# Patient Record
Sex: FEMALE | Race: BLACK OR AFRICAN AMERICAN | NOT HISPANIC OR LATINO | Employment: STUDENT | URBAN - METROPOLITAN AREA
[De-identification: names, ages, dates, MRNs, and addresses within clinical notes are randomized per-mention and may not be internally consistent; named-entity substitution may affect disease eponyms.]

---
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
## 2021-06-13 NOTE — PROGRESS NOTES
Met with patient individually. She received her treatment plan, and signed with no changes made.      Ingris Balderas, [facility]   10/20/17

## 2021-06-13 NOTE — PROGRESS NOTES
"Late Entry- Note for the week of 10/23/17- 10/27/17    Weekly Progress Note  Debbie Calero  2000  [id number]      D) Pt attended 2 groups  this week with 0 absences. A) Staff facilitated groups and reviewed tx progress. R) Patient received her treatment plan following groups this week. Pt working on the following dimensions:      Dimension #1 - Withdrawal Potential - Risk 0. No Concern.  Specific goals from treatment plan addressed this week:  Patient to remain abstinent.   Effectiveness of strategies:  Patient has reported no use.       Dimension #2 - Biomedical - Risk 0. No Concern.   Specific goals from treatment plan addressed this week:  Patient to maintain good health.   Effectiveness of strategies:  Patient appears to continue to be in good physical health. She has reported no new medical concerns.       Dimension #3 - Emotional/Behavioral/Cognitive - Risk 2. Moderate Concern.  Specific goals from treatment plan addressed this week:  None  Effectiveness of strategies:  Patient was appropriately engaged in group this week, with good behavior. She did well to encourage her peer in group to leave what had happened earlier in the day \"at the door\" and make it a good group. She was supportive of her peer and her feelings.   She was able to open up and be honest with the group about concerns that she has for her grandmother and her use, after watching the national geographic video about heroin.   Patient has not yet completed her stress worksheets.       Dimension #4 - Treatment Acceptance/Resistance - Risk 1. Mild Concern.   Specific goals from treatment plan addressed this week:  Patient to be open minded about attending CD group.   Effectiveness of strategies:  Patient attended 2 groups this week. She did make verbalizations both days about not wanting to attend, and not liking CD, but she did well to remain engaged and participate throughout both groups.   Patient has not yet completed her Having an " Open Mind worksheet.       Dimension #5 - Relapse Potential - Risk 3. Serious Concern.   Specific goals from treatment plan addressed this week:  Patient to gain insight in to her use and the effects it has had on her life.   Effectiveness of strategies:  Patient was able to fully engage in the discussion about progression of substance use. She was was able to be open and honest about things that have happened in her life due to her use, and identify what stage of use she was in. She was able to verbalize that her use has caused her a significant amount of trouble, and see that it may not be the best idea for her to return to her use.   Patient has not yet completed her Healthy coping skills worksheet, or her Individual Change plan.     Dimension #6 - Recovery Environment - Risk 3. Serious Concern.  Specific goals from treatment plan addressed this week:  Patient to identify her goals, and how her use has interfered with her reaching those.   Effectiveness of strategies:  Patient was able to verbalize an understanding of how her use has impacted her life, and gotten her into trouble through the discussion on progression.   She has not yet completed her Goals worksheet.     T) Treatment plan updated No.  Patient notified and in agreement NA.  Patient educated on Progression of Substance Use & National Geographic; Heroin, Aniceto's Story video. Patient has completed 10 program hours at this time. Projected discharge date is 1/3/18.     NASIM Cameron  10/27/2017      Psycho-Educational Curriculum Date Attended Psycho-Educational Curriculum Date Attended   Acceptance        Diagnostic Criteria 10/18/17      Progression 10/25/17 Mental Health                            Relapse          Sober Structure            Medical Aspects        Drug Categories 1013/17                       Educational Videos        Turning Point       NG: Heroin Crisis        NG: Effects of Marijuana 10/20/17      NG: Heroin;  Aniceto's  Story 10/27/17   Relationships   NG: Ecstasy       On the Outs        Which Brain Do You Want        DUI: Dead in 5 Seconds       Intervention:        Intervention:       Intervention:        Intervention:        20/20: A Deadly Drunk Driving Accident        Drunk in Public     Feelings   Addiction      20/20: Intoxication Nation        Unguarded        NG: World's Most Dangerous Drug

## 2021-06-13 NOTE — PROGRESS NOTES
D) Debbie Swain a 16 y.o. single  Black Female  who was referred by Makenna Garzon, Primary Therapist at [facility] to HE AIOP at [facility].  Patient orientated x 4.  Patient meets criteria for Cannabis Use Disorder, Moderate (F12.20).  Patient appears appropriate for HE AIOP at [facility].     A)  Completed intake assessment; preliminary paperwork;  counselor & supervisor license number and contact info;  Patient Bill of Rights; HE AIOP rules/regulations; Abuse Prevention Plan; confidentiality & HIPPA policies; grievance procedure; presented ROIs; and TB & HIV/AIDS policies & resources.       R)  Patient signed and agreed to  counselor & supervisor license number and contact info.; Patient Bill of Rights; HE AIOP rules/regulations; Abuse Prevention Plan; confidentiality & HIPPA policies; grievance procedure; presented ROIs,  and TB & HIV/AIDS policies & resources.   Dimension #1 - Withdrawal Potential - risk 0, no concern. Patient reports a history of regular marijuana use. It appears that she may have been minimizing her use, but throughout the assessment it became apparent that she was using more frequently than she originally stated, as she initially stated that she only uses on weekends, and then shared that she often uses throughout the week to relieve stress and help her sleep. Patient was using marijuana on a weekly basis.  Patient denies withdrawal symptoms. Her last reported use of marijuana was in June 2017. She also reports experimentation with alcohol, and xanax, but no pattern of use with those. Patient does not appear to be at risk of withdrawal at this time.   Dimension #2 - Biomedical - risk 0, no concern. Patient is currently under the care of [facility] [place], operated by [facility] for all medical issues. Patient is a healthy young woman with no major health concerns at this time.  Dimension #3 - Emotional , Behavioral  and Cognitive - risk 2, Moderate concern. Patient reports a mental health diagnosis of depression. She reports  "that she is not currently on any medication for this, and does not receive any services for this at this time. It is unclear if her depression is managed and stable at this time. She may struggle with impulse control as evidence by her actions in the community that led to her placement at the [facility]. She has been working to meet program expectations while at the [facility], but does struggle at times with impulsive behavior. Patient denies any suicidal or homicidal thoughts or plans.   Dimension #4 - Treatment  Acceptance - risk 1, Mild concern. Patient reports that she does not feel that she has a problem, and appears to have minimized her use throughout this assessment. She was calm and cooperative throughout this intake. Patient does not appear motivated for treatment at this time, and her motivation appears to be due to external factors such as probation and her placement at the [facility]. Patient does verbalize some willingness to discontinue her use, but it is unclear if this is genuine at this time.   Dimension #5 - Relapse Potential - risk 3, Serious concern. Patient reports no past treatment experience, which may indicate a lack of knowledge and understanding of her substance use and recovery. She appears to have minimized her use throughout her assessment, as some of the information she presented, she later conflicted herself and revealed that she used more than what she originally stated. She appears to lack insight into her use and how it negatively impacted her life, as evidence by her stating that she was not going to school, and would sometimes use instead. She appears to lack coping skills to arrest her use and prevent relapse, as evidence by her smoking \"when she wants to.\" She also appears to lack healthy coping skills for her life, as she reports that she would smoke when she was stressed out. She appears to be at an increased risk of relapse at this time due to that lack of insight and openness about the " extent of her use.  Dimension #6 - Recovery Environment - risk 3, Serious concern. Patient reports that she was not regularly attending school while in the community, and so appears to lack structured and meaningful activity in the community. She reports that she gave up sports teams at school due to her use, which she identified as important to her. Patient reports that most of her peers in the community use marijuana as well, and so it does not appear that she has support. Patient reports that her mother is supportive of her being sober, but that she otherwise does not have support for her sobriety. Patient is currently in an out of home court placement at the [facility] due to failure to meet probation expectations. Patient is currently on probation and will remain on after leaving the [facility].     T) Explained counselor & supervisor license number and contact info;  Patient Bill of Rights; HE AIOP rules/regulations; Abuse Prevention Plan; confidentiality & HIPPA policies; grievance procedure; presented ROIs; and TB & HIV/AIDS policies & resources.      NASIM Cameron    10/13/2017

## 2021-06-13 NOTE — PROGRESS NOTES
Individual Treatment Plan    Patient  Name: Debbie Calero  MRN: [id number]   : 2000  Admit Date: 10/13/17  Date of Initial Service Plan: 10/13/17  Tentative Discharge Date: 1/3/17    Counselor: NASIM Cameron      Dimension 1: Acute Intoxication/Withdrawal Potential, Risk level: 0- No Concern.     Problem: Patient denies any withdrawal symptoms, and reports that her last use of marijuana was in 2017.   Goal: Patient will remain abstinent throughout her time in treatment, and at the [facility].   Must be reached to complete treatment? Yes  Methods/Strategies (must include amount and frequency): Patient will report to her counselor immediately if any relapse happens.  Target Date: 1/3/17  Completion Date:     Problem: Patient reports a history of daily nicotine use  Goal: Patient will gain knowledge about the effects of smoking and the benefits of stopping.   Must be reached to complete treatment? Yes  Methods/Strategies (must include amount and frequency): Patient will receive educational information about smoking cessation and the benefits.   Target Date: 1/3/17  Completion Date:     Dimension 2: Biomedical Conditions/Complications, Risk level: 0- No Concern.    Problem: Patient denies any medical concerns, and appears to be in good physical health.   Goal: Patient will maintain good health.  Must be reached to complete treatment? No  Methods/Strategies (must include amount and frequency): Patient will practice proper diet, sleep, and exercise habits daily in order to maintain her health.   Target Date: 1/3/17  Completion Date:       Dimension 3: Emotional/Behavioral/Cognitive, Risk level: 2- Moderate Concern.     Problem: Patient reports that she often used as a result of stress.   Goal: Patient will gain insight into her stress.   Must be reached to complete treatment? Yes  Methods/Strategies (must include amount and frequency): Patient will complete RECOGNIZING STRESS worksheet, and share with  her counselor.   Target Date: 10/27/17  Completion Date:     Problem: Patient appears to lack skills to manage her stress in a healthy way.  Goal: Patient will gain skills to help manage her stress in a healthy way.  Must be reached to complete treatment? Yes  Methods/Strategies (must include amount and frequency): Patient will complete STRESS MANAGEMENT worksheet, and share with her counselor.   Target Date: 11/3/17  Completion Date:       Dimension 4: Readiness to Change, Risk level 1- Mild Concern.     Problem: Patient has verbalized that she does not want to attend CD group.   Goal: Patient will be open minded about being in CD and activity participate.  Must be reached to complete treatment? Yes  Methods/Strategies (must include amount and frequency): Patient will attend group 2 times per week, for 2 hours each time and actively engage.   Target Date: 1/3/17  Completion Date:     Problem: Patient appears resistant toward new things.  Goal: Patient will work to be more open minded about new experiences.  Must be reached to complete treatment? Yes  Methods/Strategies (must include amount and frequency): Patient will complete HAVE AN OPEN MIND worksheet, and share with her counselor.  Target Date: 11/17/17  Completion Date:       Dimension 5: Relapse/Continued Use/Continued Problem Potential, Risk level: 3- Serious Concern.     Problem: Patient lacks healthy coping skills to prevent relapse and manage life stress.  Goal: Patient will gain coping skills to help her prevent relapse and deal with her life stress.   Must be reached to complete treatment? Yes  Methods/Strategies (must include amount and frequency): Patient will complete HEALTHY COPING SKILLS worksheet, and share with her counselor.   Target Date: 12/1/17  Completion Date:     Problem: Patient lacks a structured plan for how to maintain abstinent in the community.   Goal: Patient will develop a plan for how she will remain sober in the community.   Must  be reached to complete treatment? Yes  Methods/Strategies (must include amount and frequency): Patient will complete INDIVIDUAL CHANGE PLAN workbook, and share with her counselor.  Target Date: 12/22/17  Completion Date:     Dimension 6: Recovery Environment, Risk level: 3- Serious Concern.     Problem: Patient lacked structure and regular prosocial activity in the community.   Goal: Patient will develop a plan for how she will reach her goals in the future, and how her use has gotten her off track.   Must be reached to complete treatment? Yes  Methods/Strategies (must include amount and frequency): Patient will complete GOALS worksheet, and share with her counselor.   Target Date: 12/8/17  Completion Date:     Problem: Patient was not regularly attending school in the community.   Goal: Patient will develop a plan for where she will attend school following the [facility].   Must be reached to complete treatment? No  Methods/Strategies (must include amount and frequency): Patient will work with her transition  to determine where she will attend school following the [facility], and enroll.  Target Date: 1/3/17  Completion Date:         Resources  Resources to which the patient is being referred for problems when problems are to be addressed concurrently by another provider: NABOR Alonzo/Primary Therapist, [facility] Staff, [facility], Probation.      By signing this document, I am acknowledging that I was actively and directly involved in the development of my treatment plan.           Patient  Signature_________________________________________         Date__________________        Staff Signature  NASIM Cameron    Date: 10/20/2017, 11:25 AM

## 2021-06-13 NOTE — PROGRESS NOTES
Note for the week of 10/16/17-10/20/17    Weekly Progress Note  Debbie Calero  2000  [id number]      D) Pt attended 2 groups  this week with 0 absences. A) Staff facilitated groups and reviewed tx progress. R) Patient received her treatment plan following groups this week. Pt working on the following dimensions:    Dimension #1 - Withdrawal Potential - Risk 0. No Concern.  Specific goals from treatment plan addressed this week:  Patient to remain abstinent.   Effectiveness of strategies:  Patient has reported no use.     Dimension #2 - Biomedical - Risk 0. No Concern.   Specific goals from treatment plan addressed this week:  None  Effectiveness of strategies:  NA    Dimension #3 - Emotional/Behavioral/Cognitive - Risk 2. Moderate Concern.  Specific goals from treatment plan addressed this week:  None- Patient received her treatment plan following groups this week and so has not yet been able to work on her assignments.  Effectiveness of strategies:  NA    Dimension #4 - Treatment Acceptance/Resistance - Risk 1. Mild Concern.   Specific goals from treatment plan addressed this week:  Patient to be open minded about attending CD group.   Effectiveness of strategies:  Patient attended 2 groups this week. She did make verbalizations both days about not wanting to attend, and not liking CD, but she did well to remain engaged and participate throughout both groups.     Dimension #5 - Relapse Potential - Risk 3. Serious Concern.   Specific goals from treatment plan addressed this week:  None- Patient received her treatment plan following groups this week and so has not yet been able to work on her assignments.  Effectiveness of strategies:  NA    Dimension #6 - Recovery Environment - Risk 3. Serious Concern.  Specific goals from treatment plan addressed this week:  None- Patient received her treatment plan following groups this week and so has not yet been able to work on her assignments.  Effectiveness of  strategies:  NA    T) Treatment plan updated No.  Patient notified and in agreement NA.  Patient educated on Diagnostic Criteria & [employer] Geographic; Effects of Marijuana Video. Patient has completed 6 program hours at this time. Projected discharge date is 1/3/18.     NASIM Cameron  10/20/2017      Psycho-Educational Curriculum Date Attended Psycho-Educational Curriculum Date Attended   Acceptance        Diagnostic Criteria 10/18/17         Mental Health                            Relapse          Sober Structure            Medical Aspects        Drug Categories 1013/17                       Educational Videos        Turning Point       NG: Heroin Crisis        NG: Effects of Marijuana 10/20/17      NG: Cocaine     Relationships   NG: Ecstasy       On the Outs        Which Brain Do You Want        DUI: Dead in 5 Seconds       Intervention:        Intervention:       Intervention:        Intervention:        20/20: A Deadly Drunk Driving Accident        Drunk in Public     Feelings   Addiction      20/20: Intoxication Nation        Unguarded        NG: World's Most Dangerous Drug

## 2021-06-13 NOTE — PROGRESS NOTES
Addiction Services - Initial Services Plan   Name:Debbie Calero   : 2000   MRN: [id number]       Patient describes their immediate need: To learn sober living skills to prevent relapse.     Are there any immediate Safety Needs such as (physical, stability, mobility): Patient is under the care of [facility]. Pt is able to get medical care as needed. Pt denies immediate concerns.     Immediate Health Needs and Plan:   Remain clean and sober and attend first group therapy session on 10/13/17.     Vulnerable Adult: No     [ ] Continue Current Medications for:    [ ] Request Consult for:   [ ] Notify Attending Physician about:   [ ] Other:     Issues to be addressed in the first sessions:   Treatment planning, orientation to group norms and expectations, and welcomed by peers. Appropriate group behavior.    Patient strengths and needs:   Strengths identified as being a leader, is working to make changes in her life, funny, and relaxed.   Needs identified as needing to work to not get annoyed by others' habits, and when she perceives that others are talking down to her.     Plan for patient for time between intake and completion of the treatment plan:   Attend all group therapy sessions as directed, complete all written and oral assignments as directed, and remain clean and sober. A relapse, if any, must be reported to staff immediately in order to ensure you are receiving the proper level of care.     Staff Members' Titles authorized to Initiate Services are:   Director of Behavioral Service   Clinical Director of Chemical Dependency   [facility] Counselor   MI/CD Counselor        Vulnerable Adult Review   [X] Review of the facility Abuse Prevention plan was reviewed with the patient   [X] No individual abuse plan is necessary   [ ] In addition to the facility Abuse Prevention plan, an Individual Abuse Plan will be put in place     I understand these goals to be the Treatment Goals of the  Program, and I agree to the stated Methods in attempting to accomplish these goals.     Patient Signature: _________________________Date: 10/13/2017       Staff Name/Title: NASIM Cameron Date: 10/13/2017   Time: 9:42 AM

## 2021-06-13 NOTE — PROGRESS NOTES
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 supportive or antagonistic to the person making changes.       Safe, no concerns mentioned at this time.     9. Criminal justice history: Gather current/recent history and any significant history related to substance use--Arrests? Convictions? Circumstances? Alcohol or drug involvement? Sentences? Still on probation or parole? Expectations of the court? Current court order? Any sex offenses - lifetime? What level? (DSM)       Theft, and probation violations. She was often on the run.     10. What obstacles exist to participating in treatment? (Time off work, childcare, funding, transportation, pending senior living time, living situation)   NA    Dimension VI Ratings   Recovery environment - The placing authority must use the criteria in Dimension VI to determine a client s recovery environment.   RISK DESCRIPTIONS - Severity rating: 3-Patient is not engaged in structured, meaningful activity and the patient's peers, family , significant other, and living environment are unsupportive, or there is significant criminal justice system involvement.    REASONS SEVERITY WAS ASSIGNED - Patient reports that she was not regularly attending school while in the  community, and so appears to lack structured and meaningful activity in the community. She reports that she gave up sports teams at school due to her use, which she identified as important to her. Patient reports that most of her peers in the community use marijuana as well, and so it does not appear that she has support. Patient reports that her mother is supportive of her being sober, but that she otherwise does not have support for her sobriety. Patient is currently in an out of home court placement at the [facility] due to failure to meet probation expectations. Patient is currently on probation and will remain on after leaving the [facility].          Client Choice/Exceptions     Would you like services specific to language, age, gender, culture, Voodoo preference, race, ethnicity, sexual orientation or disability?  yes    If yes, specify:  Client is open to diverse groups.      What particular treatment choices and options would you like to have?  Female, Adolescent     Do you have a preference for a particular treatment program?  [facility]    DSM-V Criteria for Substance Abuse  Instructions  Determine whether the client currently meets the criteria for a Substance Use Disorder using the diagnostic criteria in the DSM-V, pp. 481-589. Current means during the most recent 12 months outside a facility that controls access to substances.    Category of substance Severity ICD-10 Code/DSM V Code   []  Alcohol Use Disorder [] Mild  [] Moderate  [] Severe [] (F10.10) (305.00)  [] (F10.20) (303.90)  [] (F10.20) (303.90)   [x]  Cannabis Use Disorder [] Mild  [x] Moderate  [] Severe [] (F12.10) (305.20)  [x] (F12.20) (304.30)  [] (F12.20) (304.30)   [] Hallucinogen Use Disorder [] Mild  [] Moderate  [] Severe [] (F16.10) (305.30)  [] (F16.20) (304.50)  [] (F16.20) (304.50)   []  Inhalant Use Disorder [] Mild  [] Moderate  [] Severe [] (F18.10) (305.90)  [] (F18.20) (304.60)  [] (F18.20) (304.60)   []  Opioid Use Disorder []  Mild  [] Moderate  [] Severe [] (F11.10) (305.50)  [] (F11.20) (304.00)  [] (F11.20) (304.00)   []  Sedative, Hypnotic, or Anxiolytic Use Disorder [] Mild  [] Moderate  [] Severe [] (F13.10) (305.40)   [] (F13.20) (304.10)  [] (F13.20) (304.10)   []  Stimulant Related Disorders [] Mild  [] Moderate  [] Severe [] (F15.10) (305.70) Amphetamine type substance  [] (F14.10) (305.60) Cocaine  [] (F15.10) (305.70) Other or unspecified stimulant  [] (F15.20) (304.40) Amphetamine type substance  [] (F14.20) (304.20) Cocaine  [] (F15.20) (304.40) Other or unspecified stimulant  [] (F15.20) (304.40) Amphetamine type substance  [] (F14.20) (304.20) Cocaine  [] (F15.20) (304.40) Other or unspecified stimulant   []  Tobacco use Disorder [] Mild  [] Moderate  [] Severe [] (Z72.0) (305.1)  [] (F17.200) (305.1)  [] (F17.200) (305.1)   []  Other (or unknown) Substance Use Disorder [] Mild  [] Moderate  [] Severe [] (F19.10) (305.90)  [] (F19.20) (304.90)  [] (F19.20) (304.90)       Suggested Level of Care Necessary for Recovery  []  Inpatient  []  Extended Care []  Residential [x]  Outpatient  []  None      Collateral Contact Summary   Number of contacts made:  2  Contact with referring person:  yes     If court related records were reviewed, summarize here:  Patient has 3 UAs that tested positive for THC on 1/27/16, 1/30/17, 3/9/17     [x]   Information from collateral contacts supported/largely agreed with information from the client and associated risk ratings.   []   Information from collateral contacts was significantly different from information from the client and lead to different risk ratings.      Summarize new information here:      Rule 25 Assessment Summary and Plan   's Recommendation    Recommendation is for this patient to attend the [employer] [facility] program while at the [facility] and follow aftercare recommendations once programming is complete.      Collateral Contacts     Please duplicate this page for each  contact.  If this includes information which is sensitive and not public, separate this page from the rest of the assessment before sharing.  Retain the page in the assessment file.   Name    Makenna Garzon, [facility] Relationship    /Primary Therapist @ [facility] Phone Number    [phone] Releases    yes       Information Provided:      Makenna reported the following:    She has 3 positive UA s in MAIN for THC. 1/27/16, 1/30/17 and 3/9/17.   She did talk about regular use and also using to help her go to sleep (or she could not sleep otherwise).   Her mom has mentioned that she was concerned about her use as well.   She may be motivated to downplay her use because she wants to leave early.      Collateral Contacts     [facility]   Relationship     Phone Number    [phone] Releases    yes       Information Provided:      Lacey reported the following:    She did not know the patient well until she came to the [facility], because she was always on the run.   The patient has self-reported to her, that she was using marijuana.   The patient has a  in the community that has been working with her for longer, and may have more information. (Jess Franco).       Staff Name and Title:  NASIM Cameron      Date: 10/4/2017  Time:  12:17 PM

## 2021-06-13 NOTE — PROGRESS NOTES
Late Entry for week of 10/9/17-10/13/17,     Patient attended her first group on 10/13/17. She was engaged and behaved appropriately with no issues.   She has not yet received her treatment plan as she completed her intake today, but will be presented with it for review within the next 7 days.     NASIM Cameron   10/19/2017
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
## 2021-06-15 NOTE — PROGRESS NOTES
[facility] SUBSTANCE USE DISORDER  DISCHARGE SUMMARY            NAME:  Debbie Calero   Physician:  Dr. Jimenez   MRN:  [id number] :  Ingris Balderas [facility]   SS#:  [id number] Funding Source:  [facility]   Admit Date: 10/13/17 Discharge Date: 1/3/18     :  2000 Hours Completed: 37   Initial Diagnosis:  Cannabis Use Disorder, Moderate (F12.20). Final Diagnosis:  Cannabis Use Disorder, Moderate (F12.20)- In Early Remission, In a Controlled Environment.    Discharge Address:    [address] [place] [id number]      Discharge Type:  With Staff Approval (WSA)    Reasons for and circumstances of service termination:  Patient has completed all of her treatment plan assignments, as well as the required time in group. She appears to have gained knowledge about how her use has impacted her in the past, and has made verbalizations that she is motivated for sobriety at this time. She appears genuine in her motivation at this time. She has successfully completed the program and is being discharged with staff approval.      Dimension/Course of Treatment/Individualized Care:   1.  Withdrawal Potential - Risk level -  0  Treatment plan goals and progress towards those goals:  Patient has maintained abstinence throughout her time in the program and at [facility]. She has reported no use or relapse, and has produced clean UAs upon return from home visits. She denied withdrawal symptoms throughout her time at [facility], and does not appear to be at risk of withdrawal at this time. She was provided with educational information about smoking cessation and was also engaged in group activities about the benefits of smoking cessation during her time in treatment.        2.  Biomedical Conditions and Complications - Risk level -  0  Treatment plan goals and progress towards those goals:  Patient has maintained good health throughout her time in treatment. She has remained under the care of  [facility], and has utilized their services for her health concerns and needs as necessary. She has not expressed any new or worsening health concerns during her time in group. She appears to be in good health at her time of discharge from treatment programming. She will remain under the care of [facility] until released from [facility].        3.  Emotional/Behavioral/Cognitive Conditions and Complications - Risk level -  1  Treatment plan goals and progress towards those goals:  Patient did well to maintain appropriate behavior during her time in treatment groups. She was an active participant, despite her initial verbalizations of not wanting to be in group, and was often a main contributor to group discussions. She was able to complete her stress worksheet, and appears able to identify situations that may cause her stress and she was able to develop a plan for how she will manage that in a healthy way. Patient reported that she began medication for her anxiety while in group and that it was helpful at the time. It is unknown if the patient continues to be on that medication. She has been doing well in her time in treatment, and it appears in [facility] programming as well. She works to show leadership in the cottage, but does have minor struggles at times. She denied any suicidal thoughts or plans throughout her time in treatment.        4.  Treatment Acceptance/Resistance - Risk Level -  0  Treatment plan goals and progress towards those goals:  Patient has shown willingness to attend and actively participate in treatment groups. She did well to engage and participate, and be a main contributor to the group discussions. She no longer reports that she does not want to be in treatment, and verbalizes motivation for change and sobriety. She attended group 2 times per week, for the full time provided each time. She did complete her having an open mind worksheet, and demonstrated her ability to do so while  in treatment.        5.  Relapse/Continued Use/Continued Problem Potential - Risk level -  2  Treatment plan goals and progress towards those goals:  Patient attended educational groups in order to gain knowledge about substance use and recovery. She demonstrated some insight into her use and how it has caused issues in her life in the past. She verbalizes motivation to maintain her sobriety in the future. She completed her healthy copings skills worksheets in order to develop a list of ways that she can remain sober and effective refuse. She also completed her individual change plan, as a way to plan to maintain her sobriety in the community. She appears to have gained some skills for her recovery, but may be at some risk of relapse due to her lack of practice utilizing the skills she has learned in real life situations.        6.  Recovery Environment - Risk level -  2  Treatment plan goals and progress towards those goals:  Patient has lacked structure in her life previously. She has been working with her transition  to enroll in school, as well as to find a job for when she is released. She appears to be working to ensure that she has healthy activities that will provide structure and accountability in her life following her time at [facility]. She was able to identify goals that she has for herself and has shared some of her goals while in treatment. She has been attending school daily while at [facility], and may be leaving [facility] in order to start at her school in the community at the beginning of the semester. This is not an absolute, and will depend on the patient's behavior and program involvement at [facility]. She reports that she has been working on her relationship with her mother and that her mother is a positive support for her sobriety. It is unclear if she has other positive support or positive peers in the community at this time. Patient remains on probation and is expected to meet expectations, and  follow recommendations even upon release from [facility].        Strengths: Willing to learn, positive, leader, insightful, motivated for change, helpful, hard working, family support, strong willed, able to verbalize feelings appropriately.      Needs: ability to regulate her attitude in situations she does not agree with, prosocial activity, structure, accountability, positive peer group, personal responsibility.      Services Provided: Intake, assessment, treatment planning, education, group discussion, film, lectures, 1x1 therapy, and recommendations at discharge.            Program Involvement: Excellent  Attendance: Excellent  Ability to relate in group/   Other program activities: Good  Assignment Completion: Good  Overall Behavior: Good  Reported Family/Significant   Other Involvement: Unknown    Prognosis: Good      Recommendations       Abstain from all mood altering chemicals, identify and maintain a sober network of friends and follow probation expectations including random UAs.  Engage in structured activities such as school, employment, sports, library, [facility] membership and daily exercise.    Attend 2-community sobriety meetings, in order to know where they are in case she feels she needs more support in the community.    Here are the numbers to find 12 step meetings:  [facility] Intergroup Office: [phone]  NA's Help Line: [phone].    If relapse should occur, call [facility] at [phone] to be reassessed.      Additional services if relapse should occur:  -[facility]-[phone]  -[facility]- [phone]  -[facility]-[phone]    Mental Health Referral  Follow recommendations from [facility].    Physician Health Referral  Follow recommendations from [facility].      Counselor Name and Title:  NASIM Cameron        Date:  1/5/2018  Time:  3:51 PM

## 2021-07-04 NOTE — PROGRESS NOTES
Rule 31 by Ingris Balderas LADC at 10/13/2017  9:00 AM     Author: Ingris Balderas LADC Service: Addiction Care Author Type: Licensed Alcohol and Drug Counselor    Filed: 10/13/2017 10:41 AM Encounter Date: 10/13/2017 Status: Signed    : Ingris Balderas LADC (Licensed Alcohol and Drug Counselor)           [facility] Assessment Summary  Date: 10/13/2017        : NASIM Cameron    Name: Debbie Calero  Address: [address] [place] [id number]  Phone: [phone] (home)   Referral Source: NABOR Alonzo/Primary Therapist  : 2000  Age: 16 y.o.  Race/Ethnicity: Black or   Marital Status: Single, Never   Employment: Full Time Student                                                                                                                       Level of Education: currently in 10th grade   Socio-economic (yearly Income) Status: NA  Sexual Orientation: Heterosexual   Last 4 digits of Social Security: 5452    Is assistance required in the ability to read and understand written material?   no     Reason for seeking services:    Patient was referred for her assessment by her primary therapist at the [facility].   She reports that she does not believe she needs to change, but she is here due to dirty UAs, and concern from her .     Dimension I Acute intoxication/Withdrawal Potential:    Symptomology (past 12 months, check all that apply)  binges, medicinal use, using alone, repeated family or social problems and family history of addiction    Observed or reported (withdrawal symptoms, check all that apply)  none reported or displayed    Chemical use most recent 12 months outside a facility and other significant use history (client self-report)  Primary Drug Used  Age of First Use  Most Recent Pattern of Use and Duration    Date of last use  Time if substance use in the last 30 days Withdrawal Potential? Requiring special care  Method of use    (oral, smoked, snort, IV, etc)    Alcohol  16 1x- 1 shot of Shanthi 2017 NA none oral   Marijuana/Hashish  15 1x per week, on the weekend (2 blunts throughout the weekend.   She later stated that she smokes when she is stressed throughout the week, and will smoke 1 blunt over the span of 4 days. 2017 NA none smoking   Cocaine/Crack   Denies       Meth/Amphetamines   Denies       Heroin   Denies       Other Opiates/Synthetics   Denies       Inhalants   Denies       Benzodiazepines  16 Xanax- 2x, 1/2 a tablet 2017 NA none oral   Hallucinogens   Denies       Barbiturates/Sedatives/Hypnotics   Denies       Over-the-Counter Drugs   Denies       Other   Denies       Nicotine  16 2-3 cigarettes daily 2017 NA none smoking         Dimension I Risk Ratin- No Concern.    Reason Risk Rating Assigned: Patient reports a history of regular marijuana use. It appears that she may have been minimizing her use, but throughout the assessment it became apparent that she was using more frequently than she originally stated, as she initially stated that she only uses on weekends, and then shared that she often uses throughout the week to relieve stress and help her sleep. Patient was using marijuana on a weekly basis.  Patient denies withdrawal symptoms. Her last reported use of marijuana was in 2017. She also reports experimentation with alcohol, and xanax, but no pattern of use with those. Patient does not appear to be at risk of withdrawal at this time.         Dimension II Biomedical Conditions:    Any known health conditions: No    Ever previously treated/diagnosed with any eating disorder?  no     List Health Concerns/Conditions Reported: Denies    Are Health Concerns/Conditions being treated? Yes  By Whom? [facility]    Are you pregnant: No OB care received:NA CPS call needed: NA        Dimension II Risk Ratin- No Concern.   Reason Risk Rating Assigned: Patient is currently under  the care of [facility] for all medical issues. Patient is a healthy young woman with no major health concerns at this time.        Dimension III Emotional/Behavioral/Cognitive:    Oriented to person, place, time, situation?  Yes     Current Mental Health Services: yes - Receiving services from [facility]    Past Hospitalization for MH or psychiatric problems: No    How many Hospitalizations: 0   Last Hospitalization; date and location: NA      Past or Current Issues with Gambling (Explain): no    Prior Treatment for Gambling: No     MH Diagnoses:    Depression    Psychiatrist: None     Clinic: [facility]      Current Psychotropic Medications:  None reported    Taking medications as prescribed: NA   Medications Helpful:  NA     Current Suicidal Ideation: No  If yes, any plan? NA  What is plan?:   None    Previous Suicide Attempts?  No   Explain: Denies     Current Homicidal Ideation: No  If yes, any plan? No   What is plan?: NA    Previous Homicide Attempts? No Explain: NA    Suicidal/Homicidal Ideation in last 30 days? No  Explain: Denies     Hazardous behavior engaged in which placed self or others in danger (i.e., operating a motor vehicle, unsafe sex, sharing needles, etc.)?   Denies.      Family history of substance and/or mental health diagnosis/issues?  Yes  Explain: Patient reports that her mother was in group home for heroin and crack cocaine use. She reports that her mother and step-father have depression, anxiety and bipolar disorder.      History of abuse (Physical, Emotional, Sexual)? No  Explain: Denies.        Dimension III Risk Ratin- Moderate Concern.   Reason Risk Rating Assigned: Patient reports a mental health diagnosis of depression. She reports that she is not currently on any medication for this, and does not receive any services for this at this time. It is unclear if her depression is managed and stable at this time. She may struggle with impulse control  "as evidence by her actions in the community that led to her placement at the [facility]. She has been working to meet program expectations while at the [facility], but does struggle at times with impulsive behavior. Patient denies any suicidal or homicidal thoughts or plans.         Dimension IV Readiness to Change:    Mandated, or coerced into assessment or treatment:  Yes    Does client feel there is a problem:   No    Verbalization of need/desire to change:   No     Impression of : (Check all that apply):    cooperative, ambivalent about change and low motivation    Are there any spiritual, cultural, or other special needs to be addressed for client to be successful in treatment? no      Dimension IV Risk Ratin- Mild Concern.    Reason Risk Rating Assigned: Patient reports that she does not feel that she has a problem, and appears to have minimized her use throughout this assessment. She was calm and cooperative throughout this intake. Patient does not appear motivated for treatment at this time, and her motivation appears to be due to external factors such as probation and her placement at the [facility]. Patient does verbalize some willingness to discontinue her use, but it is unclear if this is genuine at this time.         Dimension V Relapse/Continued Use/Continued Problem Potential     Client age at First Treatment: NA    Lifetime # of CD Treatments:  NA  List program, dates, and status of completion (within last five years): NA    Longest Period of Abstinence: 4.5 months  How did you accomplish this? School was starting, she was involved in sports teams that required her to be sober.      Circumstances which led to Relapse: \"just wanting to smoke.\"    Risk Taking/Problem Behaviors Related to Use and/or Under the Influence: Denies.       Dimension V Risk Rating: 3- Serious Concern.    Reason Risk Rating Assigned: Patient reports no past treatment experience, which may indicate a lack of knowledge and understanding " "of her substance use and recovery. She appears to have minimized her use throughout her assessment, as some of the information she presented, she later conflicted herself and revealed that she used more than what she originally stated. She appears to lack insight into her use and how it negatively impacted her life, as evidence by her stating that she was not going to school, and would sometimes use instead. She appears to lack coping skills to arrest her use and prevent relapse, as evidence by her smoking \"when she wants to.\" She also appears to lack healthy coping skills for her life, as she reports that she would smoke when she was stressed out. She appears to be at an increased risk of relapse at this time due to that lack of insight and openness about the extent of her use.        Dimension VI Recovery Environment   Family support:  Yes  Peer Sober Support:  No    Current living circumstances:  Mohan in a juvenile court placement at [facility], has plans to live with her mother in the community.    Environment supportive of recovery:  Yes    Specific activities participating in which do not involve substance use:  Playing sports- step team, basketball, and football    Specific activities participating in which do involve substance use:  Patient reports that she used to help her sleep. She reports that otherwise she would continue on her normal life even while under the influence.     People, things that threaten recovery: yes - old peer group.     Expected family involvement during treatment services:  Limited family involvement, primarily at mid-point and pre-release staffing with treatment team present    Current Legal Involvement:  Juvenile Probation    Legal Consequences related to use: theft, probation violations. Patient reports that she was frequently on the run as well.     Occupational/Academic consequences related to use: Patient was not consistently going to school while in the " community.     Current ability to function in a work and/or education setting: yes    Current support network for recovery (including community-based recovery support): None    Do you belong to a [place]: No Which [place]?   Reside on reservation: NA    Dimension VI Risk Rating: 3- Serious Concern.   Reason Risk Rating Assigned: Patient reports that she was not regularly attending school while in the community, and so appears to lack structured and meaningful activity in the community. She reports that she gave up sports teams at school due to her use, which she identified as important to her. Patient reports that most of her peers in the community use marijuana as well, and so it does not appear that she has support. Patient reports that her mother is supportive of her being sober, but that she otherwise does not have support for her sobriety. Patient is currently in an out of home court placement at the [facility] due to failure to meet probation expectations. Patient is currently on probation and will remain on after leaving the [facility].           DSM-V Criteria for Substance Abuse  Instructions:  Determine whether the client currently meets the criteria for a Substance Use Disorder using the diagnostic criteria in the  DSM-V, pp. 481-589. Current means during the most recent 12 months outside a facility that controls access to substances.    Category of substance Severity ICD-10 Code/DSM V Code  Alcohol Use Disorder Mild  Moderate  Severe (F10.10) (305.00)  (F10.20) (303.90)  (F10.20) (303.90)   Cannabis Use Disorder Mild  Moderate  Severe (F12.10) (305.20)  (F12.20) (304.30)  (F12.20) (304.30)   Hallucinogen Use Disorder Mild  Moderate  Severe (F16.10) (305.30)  (F16.20) (304.50)  (F16.20) (304.50)   Inhalant Use Disorder Mild  Moderate  Severe (F18.10) (305.90)  (F18.20) (304.60)  (F18.20) (304.60)   Opioid Use Disorder Mild  Moderate  Severe (F11.10) (305.50)  (F11.20) (304.00)  (F11.20) (304.00)   Sedative, Hypnotic, or  Anxiolytic Use Disorder Mild  Moderate  Severe (F13.10) (305.40)  (F13.20) (304.10)  (F13.20) (304.10)   Stimulant Related Disorders Mild              Moderate              Severe   (F15.10) (305.70) Amphetamine type substance  (F14.10) (305.60) Cocaine  (F15.10) (305.70) Other or unspecified stimulant    (F15.20) (304.40) Amphetamine type substance  (F14.20) (304.20) Cocaine  (F15.20) (304.40) Other or unspecified stimulant    (F15.20) (304.40) Amphetamine type substance  (F14.20) (304.20) Cocaine  (F15.20) (304.40) Other or unspecified stimulant   DisorderTobacco use Disorder Mild  Moderate  Severe (Z72.0) (305.1)  (F17.200) (305.1)  (F17.200) (305.1)   Other (or unknown) Substance Use Disorder Mild  Moderate  Severe (F19.10) (305.90)  (F19.20) (304.90)  (F19.20) (304.90)     Diagnostic Impression: Cannabis Use Disorder, Moderate (F12.20)    Assessment Completed Within 3 Sessions of Admission: Yes  If NO, date assessment to be completed noted in Treatment Plan:       Signature of Counselor: NASIM Cameron  Date and Time of Signature: 10/13/2017, 10:15 AM

## 2023-02-16 ENCOUNTER — TELEPHONE (OUTPATIENT)
Dept: NURSING | Facility: CLINIC | Age: 23
End: 2023-02-16

## 2023-02-16 ENCOUNTER — HOSPITAL ENCOUNTER (EMERGENCY)
Facility: CLINIC | Age: 23
Discharge: HOME | End: 2023-02-16
Attending: EMERGENCY MEDICINE | Admitting: EMERGENCY MEDICINE
Payer: COMMERCIAL

## 2023-02-16 VITALS
RESPIRATION RATE: 18 BRPM | OXYGEN SATURATION: 98 % | TEMPERATURE: 98.2 F | SYSTOLIC BLOOD PRESSURE: 135 MMHG | DIASTOLIC BLOOD PRESSURE: 92 MMHG | BODY MASS INDEX: 28.04 KG/M2 | WEIGHT: 185 LBS | HEART RATE: 85 BPM | HEIGHT: 68 IN

## 2023-02-16 DIAGNOSIS — J02.9 SORE THROAT: ICD-10-CM

## 2023-02-16 LAB
FLUAV RNA SPEC QL NAA+PROBE: NEGATIVE
FLUBV RNA RESP QL NAA+PROBE: NEGATIVE
RSV RNA SPEC NAA+PROBE: NEGATIVE
SARS-COV-2 RNA RESP QL NAA+PROBE: POSITIVE

## 2023-02-16 PROCEDURE — 99283 EMERGENCY DEPT VISIT LOW MDM: CPT | Mod: CS | Performed by: EMERGENCY MEDICINE

## 2023-02-16 PROCEDURE — 87637 SARSCOV2&INF A&B&RSV AMP PRB: CPT | Performed by: EMERGENCY MEDICINE

## 2023-02-16 PROCEDURE — C9803 HOPD COVID-19 SPEC COLLECT: HCPCS | Performed by: EMERGENCY MEDICINE

## 2023-02-16 RX ORDER — DIPHENHYDRAMINE HYDROCHLORIDE AND LIDOCAINE HYDROCHLORIDE AND ALUMINUM HYDROXIDE AND MAGNESIUM HYDRO
10 KIT EVERY 6 HOURS PRN
Qty: 237 ML | Refills: 0 | Status: SHIPPED | OUTPATIENT
Start: 2023-02-16

## 2023-02-16 NOTE — ED PROVIDER NOTES
"ED Provider Note  [facility]      History     Chief Complaint   Patient presents with     Pharyngitis     HPI  Debbie Calero is a 22 year old female who is presenting with pharyngitis.'s been off and on for 3 weeks.  No fevers, vomiting.  She is eating and drinking.  No shortness of breath or fevers.  Has had a mild runny nose.  She has had negative COVID and strep test recently.  She has tried Tylenol occasionally for her pain.  Right now the pain is mostly on the left side, she is worried about thrush.    Past Medical History  Past Medical History:   Diagnosis Date     Uncomplicated asthma      History reviewed. No pertinent surgical history.  magic mouthwash suspension (diphenhydrAMINE, lidocaine, aluminum-magnesium & simethicone)      Allergies   Allergen Reactions     Kiwi      Other reaction(s): Swelling, lips/tongue     Family History  History reviewed. No pertinent family history.  Social History   Social History     Tobacco Use     Smoking status: Never     Passive exposure: Yes     Smokeless tobacco: Never   Substance Use Topics     Alcohol use: Never     Drug use: Not Currently     Types: Marijuana      Past medical history, past surgical history, medications, allergies, family history, and social history were reviewed with the patient. No additional pertinent items.      A medically appropriate review of systems was performed with pertinent positives and negatives noted in the HPI, and all other systems negative.    Physical Exam   BP: (!) 135/92  Pulse: 85  Temp: 98.2  F (36.8  C)  Resp: 18  Height: 172.7 cm (5' 8\")  Weight: 83.9 kg (185 lb)  SpO2: 98 %  Physical Exam  Physical Exam   Constitutional: oriented to person, place, and time. appears well-developed and well-nourished.   HENT:   Head: Normocephalic and atraumatic. Uvula midline.  No posterior oropharynx discharge or erythema.  No stridor.  Floor mouth soft.  No submandibular swelling.  There is a small white " appearing circular object in the left oropharynx just posterior to the molars on the left side.  Tender to touch.  No significant induration or swelling.  Neck: Normal range of motion.   Pulmonary/Chest: Effort normal. No respiratory distress.   Cardiac: No murmurs, rubs, gallops. RRR.  Abdominal: Abdomen soft, nontender, nondistended. No rebound tenderness.  MSK: Long bones without deformity or evidence of trauma  Neurological: alert and oriented to person, place, and time.   Skin: Skin is warm and dry.   Psychiatric:  normal mood and affect.  behavior is normal. Thought content normal.       ED Course, Procedures, & Data      Procedures                No results found for any visits on 02/16/23.  Medications - No data to display  Labs Ordered and Resulted from Time of ED Arrival to Time of ED Departure - No data to display  No orders to display          Medical Decision Making  The patient's presentation is strongly suggestive of an acute and uncomplicated illness or injury.    The patient's evaluation involved:  review of external note(s) from 1 sources (PCP visit)    The patient's management involved prescription drug management (including medications given in the ED).      Assessment & Plan    MDM  Patient presenting with sore throat.  She is mainly pointing to one small white dot on the side of her mouth that appears to be small sialolithiasis.  No evidence of infection in the oropharynx.  Very unlikely peritonsillar abscess, retropharyngeal abscess, epiglottitis, Ludewig's angina or other acute infection.  Discussed trying sour candy such as lemon heads.  She is also given Magic mouthwash.  She did not want a wait for her strep or COVID testing.  She otherwise appears quite well and nontoxic.  She has no respiratory symptoms.  She is tolerating p.o. without difficulty.  She will follow-up with ENT if not improving.    I have reviewed the nursing notes. I have reviewed the findings, diagnosis, plan and need  for follow up with the patient.    New Prescriptions    MAGIC MOUTHWASH SUSPENSION (DIPHENHYDRAMINE, LIDOCAINE, ALUMINUM-MAGNESIUM & SIMETHICONE)    Swish and swallow 10 mLs in mouth every 8 hours as needed for mouth sores       Final diagnoses:   Sore throat       Chris Díaz  [facility] EMERGENCY DEPARTMENT  2/16/2023     Chris Díaz MD  02/16/23 0457

## 2023-02-16 NOTE — ED TRIAGE NOTES
Pt ambulatory to ED a/ox4 c/o sore throat and difficulty swallowing for the last x3 weeks. Pt stated was seen at her clinic yesterday and was tested for strep which was negative and covid result pending. Denied sob, fever.      Triage Assessment     Row Name 02/16/23 0427       Triage Assessment (Adult)    Airway WDL WDL       Respiratory WDL    Respiratory WDL WDL       Skin Circulation/Temperature WDL    Skin Circulation/Temperature WDL WDL       Cardiac WDL    Cardiac WDL WDL       Peripheral/Neurovascular WDL    Peripheral Neurovascular WDL WDL       Cognitive/Neuro/Behavioral WDL    Cognitive/Neuro/Behavioral WDL WDL

## 2023-02-16 NOTE — RESULT ENCOUNTER NOTE
Negative for Influenza A, Influenza B, and RSV.  Positive Covid19 result and the patient will be notified of their positive Covid19 result via [employer] (if active) or they will be contacted by via phone call if not active on [employer].  A letter is sent to patient via [employer] or via mail (if no [employer]).

## 2023-02-16 NOTE — TELEPHONE ENCOUNTER
Call from patient asking for her test results.  Informed her COVID-19 test was positive. Asked if patient wanted information about COVID-19 but patient asked about strep tested. Writer informed her that there was no strep test done.    Herber Galicia RN, BSN  Triage Nurse Advisor    
Patient classified as COVID treatment eligible by Epic high risk algorithm:  yes  Coronavirus (COVID-19) Notification    Reason for call  Notify of POSITIVE COVID-19 lab result, assess symptoms,  review [facility] recommendations    Lab Result   Lab test for 2019-nCoV rRt-PCR or SARS-COV-2 PCR  Oropharyngeal AND/OR nasopharyngeal swabs were POSITIVE for 2019-nCoV RNA [OR] SARS-COV-2 RNA (COVID-19) RNA     We have been unable to reach patient by phone at this time to notify of their Positive COVID-19 result.    Left voicemail message requesting a call back to [phone] [facility] for results.        A Positive COVID-19 letter will be sent via [employer] or the mail.    Gosia Leo    
Yes

## 2023-02-16 NOTE — DISCHARGE INSTRUCTIONS
You may have a small stone in your salivary glands.  You can try sour candies such as lemon heads to help expel this.    Please make an appointment to follow up with [facility] (phone: [phone])  if not improving.    If Debbie has discomfort from fever or other pain, she can have:  Acetaminophen (Tylenol) every 6 hours as needed. Her dose is:    2 regular strength tabs (650 mg)                                     (43.2+ kg/96+ lb)    NOTE: If your acetaminophen (Tylenol) came with a dropper marked with 0.4 and 0.8 ml, call us ([phone]) or check with your doctor about the dose before using it.     AND/OR      Ibuprofen (Advil, Motrin) every 6 hours as needed. His/her dose is:    2 regular strength tabs (400 mg)                                                                         (40-60 kg/ lb)

## 2023-02-18 ENCOUNTER — NURSE TRIAGE (OUTPATIENT)
Dept: NURSING | Facility: CLINIC | Age: 23
End: 2023-02-18

## 2023-02-18 ENCOUNTER — VIRTUAL VISIT (OUTPATIENT)
Dept: URGENT CARE | Facility: CLINIC | Age: 23
End: 2023-02-18
Payer: COMMERCIAL

## 2023-02-18 DIAGNOSIS — J45.909 MILD ASTHMA WITHOUT COMPLICATION, UNSPECIFIED WHETHER PERSISTENT: Primary | ICD-10-CM

## 2023-02-18 DIAGNOSIS — R07.0 THROAT PAIN: ICD-10-CM

## 2023-02-18 DIAGNOSIS — U07.1 INFECTION DUE TO 2019 NOVEL CORONAVIRUS: ICD-10-CM

## 2023-02-18 PROCEDURE — 99203 OFFICE O/P NEW LOW 30 MIN: CPT | Mod: CS

## 2023-02-18 RX ORDER — PREDNISONE 20 MG/1
40 TABLET ORAL DAILY
Qty: 6 TABLET | Refills: 0 | Status: SHIPPED | OUTPATIENT
Start: 2023-02-18 | End: 2023-02-21

## 2023-02-18 NOTE — PROGRESS NOTES
Subjective   HPI    Has been sick for the 2 weeks  Has had cold and congestion   Throat pain started a week ago   Patient is actually feeling better except for her throat pain   Was seen in ER 2 days ago and that is how she found out she had covid    Not pregnant or breastfeeding  No chest pain or shortness of breath     Patient history of asthma - no exacarbation doing well    ROS:  denies any exertional chest pain or shortness of breath  denies any unusual rash or joint swelling  denies post-tussive emesis or pertussis like symptoms  Negative for constitutional, eye, ear, nose, throat, skin, respiratory, cardiac, and gastrointestinal other than those outlined in the HPI.    PMH: chart reviewed  FH: chart reviewed    SH: chart reviewed and as above   Physical Exam:   LMP  (LMP Unknown)    PSYCH: Alert and oriented times 3; coherent speech, normal   rate and volume, able to articulate logical thoughts, able   to abstract reason, no tangential thoughts, no hallucinations   or delusions  Her affect is normal  RESP: No cough, no audible wheezing, able to talk in full sentences  Additional exam:  none  Remainder of exam unable to be completed due to telephone visits      ICD-10-CM    1. Mild asthma without complication, unspecified whether persistent  J45.909       2. Infection due to 2019 novel coronavirus  U07.1       3. Throat pain  R07.0 predniSONE (DELTASONE) 20 MG tablet        Symptoms > 5 days, no longer eligible for any antiviral treatment    Throat pain is her main lingering complaint. The rest of her symptoms are improved  Trial prednisone - side effects discussed. No history of Diabetes.   Alarm signs or symptoms discussed, if present recommend go to ER   supportive treatment: advised supportive treatment, Advised to come back in if with any worsening symptoms or if not better despite supportive measures. Especially if with any worsening sore throat, inability to eat or drink or swallow, or trismus.  Symptoms of peritonsillar abscess discussed. Patient voiced understanding.  adverse reactions of medication discussed  OTC medications discussed  advised to come back in right away if with any worsening symptoms or if with no relief despite treatment plan  patient voiced understanding and had no further questions at this time.    Telephone call: 11 minutes     Sofiya Dalal M.D.

## 2023-02-18 NOTE — TELEPHONE ENCOUNTER
Patient calling for assistance with two concerns:     1. Pt diagnosed with COVID via phone call on 02/16, questions if she needs Paxlovid.   2. Pt given an Rx for Magic Mouthwash while in the ED on 02/16 but her pharmacy will not dispense d/t needing a PA. Pt states she needs this medication.        Per ED note, pt has been ill x 3 weeks but has had negative COVID tests up until the ED visit on 02/16.     Since it's the weekend and she is not a [facility] patient, RN advised a virtual visit to discuss both COVID tx and to discuss other medication options for her throat pain. COVID treatment might not be indicated d/t duration of illness overall. Patient verbalized understanding and had no further questions.  Call warm transferred to scheduling to set up a virtual visit for today.       RN COVID TREATMENT VISIT  02/18/23      Debbie Calero  22 year old  Current weight? 185 lbs    Has the patient been seen by a primary care provider at an [facility] or [facility] within the past two years? No, therefore patient is not eligible for COVID treatment standing order. Patient informed a virtual visit with a provider will be required for treatment. Patient will be scheduled or transferred to a  at the end of this call.   Laine Adams RN          Reason for Disposition    Prescription request for new medicine (not a refill)    Additional Information    Negative: [1] Intentional drug overdose AND [2] suicidal thoughts or ideas    Negative: MORE THAN A DOUBLE DOSE of a prescription or over-the-counter (OTC) drug    Negative: [1] DOUBLE DOSE (an extra dose or lesser amount) of prescription drug AND [2] any symptoms (e.g., dizziness, nausea, pain, sleepiness)    Negative: [1] DOUBLE DOSE (an extra dose or lesser amount) of over-the-counter (OTC) drug AND [2] any symptoms (e.g., dizziness, nausea, pain, sleepiness)    Negative: Took another person's prescription drug     Negative: [1] DOUBLE DOSE (an extra dose or lesser amount) of prescription drug AND [2] NO symptoms (Exception: a double dose of antibiotics)    Negative: Diabetes drug error or overdose (e.g., took wrong type of insulin or took extra dose)    Negative: [1] Prescription not at pharmacy AND [2] was prescribed by PCP recently (Exception: triager has access to EMR and prescription is recorded there. Go to Home Care and confirm for pharmacy.)    Negative: [1] Pharmacy calling with prescription question AND [2] triager unable to answer question    Negative: [1] Caller has URGENT medicine question about med that PCP or specialist prescribed AND [2] triager unable to answer question    Negative: Medicine patch causing local rash or itching    Negative: [1] Caller has medicine question about med NOT prescribed by PCP AND [2] triager unable to answer question (e.g., compatibility with other med, storage)    Protocols used: MEDICATION QUESTION CALL-A-

## 2023-04-23 ENCOUNTER — HEALTH MAINTENANCE LETTER (OUTPATIENT)
Age: 23
End: 2023-04-23

## 2024-06-30 ENCOUNTER — HEALTH MAINTENANCE LETTER (OUTPATIENT)
Age: 24
End: 2024-06-30

## 2025-07-13 ENCOUNTER — HEALTH MAINTENANCE LETTER (OUTPATIENT)
Age: 25
End: 2025-07-13

## 2025-07-25 ENCOUNTER — HOSPITAL ENCOUNTER (EMERGENCY)
Facility: HOSPITAL | Age: 25
Discharge: HOME | End: 2025-07-25
Admitting: EMERGENCY MEDICINE
Payer: COMMERCIAL

## 2025-07-25 VITALS
TEMPERATURE: 98.8 F | RESPIRATION RATE: 18 BRPM | HEIGHT: 67 IN | WEIGHT: 188.2 LBS | BODY MASS INDEX: 29.54 KG/M2 | HEART RATE: 57 BPM | DIASTOLIC BLOOD PRESSURE: 77 MMHG | OXYGEN SATURATION: 100 % | SYSTOLIC BLOOD PRESSURE: 126 MMHG

## 2025-07-25 DIAGNOSIS — R11.2 NAUSEA AND VOMITING: Primary | ICD-10-CM

## 2025-07-25 DIAGNOSIS — R10.9 ABDOMINAL DISCOMFORT: ICD-10-CM

## 2025-07-25 LAB
ALBUMIN UR-MCNC: 20 MG/DL
ANION GAP SERPL CALCULATED.3IONS-SCNC: 11 MMOL/L (ref 7–15)
APPEARANCE UR: CLEAR
BILIRUB UR QL STRIP: NEGATIVE
BUN SERPL-MCNC: 11.4 MG/DL (ref 6–20)
CALCIUM SERPL-MCNC: 9.5 MG/DL (ref 8.8–10.4)
CHLORIDE SERPL-SCNC: 106 MMOL/L (ref 98–107)
COLOR UR AUTO: YELLOW
CREAT SERPL-MCNC: 0.88 MG/DL (ref 0.51–0.95)
CRP SERPL-MCNC: <3 MG/L
EGFRCR SERPLBLD CKD-EPI 2021: >90 ML/MIN/1.73M2
ERYTHROCYTE [DISTWIDTH] IN BLOOD BY AUTOMATED COUNT: 14 % (ref 10–15)
GLUCOSE SERPL-MCNC: 101 MG/DL (ref 70–99)
GLUCOSE UR STRIP-MCNC: NEGATIVE MG/DL
HCG UR QL: NEGATIVE
HCO3 SERPL-SCNC: 25 MMOL/L (ref 22–29)
HCT VFR BLD AUTO: 36.6 % (ref 35–47)
HGB BLD-MCNC: 11.2 G/DL (ref 11.7–15.7)
HGB UR QL STRIP: ABNORMAL
HOLD SPECIMEN: NORMAL
KETONES UR STRIP-MCNC: 20 MG/DL
LEUKOCYTE ESTERASE UR QL STRIP: NEGATIVE
LIPASE SERPL-CCNC: 15 U/L (ref 13–60)
MCH RBC QN AUTO: 23.1 PG (ref 26.5–33)
MCHC RBC AUTO-ENTMCNC: 30.6 G/DL (ref 31.5–36.5)
MCV RBC AUTO: 76 FL (ref 78–100)
MUCOUS THREADS #/AREA URNS LPF: PRESENT /LPF
NITRATE UR QL: NEGATIVE
PH UR STRIP: 6.5 [PH] (ref 5–7)
PLATELET # BLD AUTO: 230 10E3/UL (ref 150–450)
POTASSIUM SERPL-SCNC: 4 MMOL/L (ref 3.4–5.3)
RBC # BLD AUTO: 4.84 10E6/UL (ref 3.8–5.2)
RBC URINE: 6 /HPF
SODIUM SERPL-SCNC: 142 MMOL/L (ref 135–145)
SP GR UR STRIP: 1.03 (ref 1–1.03)
SQUAMOUS EPITHELIAL: 1 /HPF
UROBILINOGEN UR STRIP-MCNC: 2 MG/DL
WBC # BLD AUTO: 6.9 10E3/UL (ref 4–11)
WBC URINE: 2 /HPF

## 2025-07-25 PROCEDURE — 80048 BASIC METABOLIC PNL TOTAL CA: CPT | Performed by: PHYSICIAN ASSISTANT

## 2025-07-25 PROCEDURE — 99284 EMERGENCY DEPT VISIT MOD MDM: CPT | Mod: 25

## 2025-07-25 PROCEDURE — 86140 C-REACTIVE PROTEIN: CPT | Performed by: PHYSICIAN ASSISTANT

## 2025-07-25 PROCEDURE — 250N000011 HC RX IP 250 OP 636: Performed by: PHYSICIAN ASSISTANT

## 2025-07-25 PROCEDURE — 85018 HEMOGLOBIN: CPT | Performed by: PHYSICIAN ASSISTANT

## 2025-07-25 PROCEDURE — 93005 ELECTROCARDIOGRAM TRACING: CPT | Performed by: PHYSICIAN ASSISTANT

## 2025-07-25 PROCEDURE — 81025 URINE PREGNANCY TEST: CPT | Performed by: PHYSICIAN ASSISTANT

## 2025-07-25 PROCEDURE — 96375 TX/PRO/DX INJ NEW DRUG ADDON: CPT

## 2025-07-25 PROCEDURE — 96361 HYDRATE IV INFUSION ADD-ON: CPT

## 2025-07-25 PROCEDURE — 36415 COLL VENOUS BLD VENIPUNCTURE: CPT | Performed by: STUDENT IN AN ORGANIZED HEALTH CARE EDUCATION/TRAINING PROGRAM

## 2025-07-25 PROCEDURE — 258N000003 HC RX IP 258 OP 636: Performed by: PHYSICIAN ASSISTANT

## 2025-07-25 PROCEDURE — 96374 THER/PROPH/DIAG INJ IV PUSH: CPT

## 2025-07-25 PROCEDURE — 81001 URINALYSIS AUTO W/SCOPE: CPT | Performed by: STUDENT IN AN ORGANIZED HEALTH CARE EDUCATION/TRAINING PROGRAM

## 2025-07-25 PROCEDURE — 83690 ASSAY OF LIPASE: CPT | Performed by: PHYSICIAN ASSISTANT

## 2025-07-25 PROCEDURE — 250N000011 HC RX IP 250 OP 636: Performed by: STUDENT IN AN ORGANIZED HEALTH CARE EDUCATION/TRAINING PROGRAM

## 2025-07-25 RX ORDER — ONDANSETRON 4 MG/1
4 TABLET, ORALLY DISINTEGRATING ORAL EVERY 8 HOURS PRN
Qty: 10 TABLET | Refills: 0 | Status: SHIPPED | OUTPATIENT
Start: 2025-07-25 | End: 2025-07-28

## 2025-07-25 RX ORDER — ONDANSETRON 2 MG/ML
4 INJECTION INTRAMUSCULAR; INTRAVENOUS
Status: COMPLETED | OUTPATIENT
Start: 2025-07-25 | End: 2025-07-25

## 2025-07-25 RX ORDER — FAMOTIDINE 20 MG/1
20 TABLET, FILM COATED ORAL 2 TIMES DAILY
Qty: 20 TABLET | Refills: 0 | Status: SHIPPED | OUTPATIENT
Start: 2025-07-25

## 2025-07-25 RX ORDER — KETOROLAC TROMETHAMINE 15 MG/ML
15 INJECTION, SOLUTION INTRAMUSCULAR; INTRAVENOUS ONCE
Status: COMPLETED | OUTPATIENT
Start: 2025-07-25 | End: 2025-07-25

## 2025-07-25 RX ADMIN — ONDANSETRON 4 MG: 2 INJECTION, SOLUTION INTRAMUSCULAR; INTRAVENOUS at 18:38

## 2025-07-25 RX ADMIN — FAMOTIDINE 20 MG: 10 INJECTION, SOLUTION INTRAVENOUS at 18:52

## 2025-07-25 RX ADMIN — KETOROLAC TROMETHAMINE 15 MG: 15 INJECTION, SOLUTION INTRAMUSCULAR; INTRAVENOUS at 18:52

## 2025-07-25 RX ADMIN — SODIUM CHLORIDE 1000 ML: 0.9 INJECTION, SOLUTION INTRAVENOUS at 18:42

## 2025-07-25 ASSESSMENT — COLUMBIA-SUICIDE SEVERITY RATING SCALE - C-SSRS
1. IN THE PAST MONTH, HAVE YOU WISHED YOU WERE DEAD OR WISHED YOU COULD GO TO SLEEP AND NOT WAKE UP?: NO
2. HAVE YOU ACTUALLY HAD ANY THOUGHTS OF KILLING YOURSELF IN THE PAST MONTH?: NO
6. HAVE YOU EVER DONE ANYTHING, STARTED TO DO ANYTHING, OR PREPARED TO DO ANYTHING TO END YOUR LIFE?: NO

## 2025-08-01 LAB
ATRIAL RATE - MUSE: 51 BPM
DIASTOLIC BLOOD PRESSURE - MUSE: NORMAL MMHG
INTERPRETATION ECG - MUSE: NORMAL
P AXIS - MUSE: 49 DEGREES
PR INTERVAL - MUSE: 172 MS
QRS DURATION - MUSE: 88 MS
QT - MUSE: 420 MS
QTC - MUSE: 387 MS
R AXIS - MUSE: 78 DEGREES
SYSTOLIC BLOOD PRESSURE - MUSE: NORMAL MMHG
T AXIS - MUSE: 68 DEGREES
VENTRICULAR RATE- MUSE: 51 BPM